# Patient Record
Sex: MALE | Race: BLACK OR AFRICAN AMERICAN | NOT HISPANIC OR LATINO | URBAN - METROPOLITAN AREA
[De-identification: names, ages, dates, MRNs, and addresses within clinical notes are randomized per-mention and may not be internally consistent; named-entity substitution may affect disease eponyms.]

---

## 2018-02-10 ENCOUNTER — EMERGENCY (EMERGENCY)
Facility: HOSPITAL | Age: 28
LOS: 0 days | Discharge: HOME | End: 2018-02-10
Attending: EMERGENCY MEDICINE
Payer: COMMERCIAL

## 2018-02-10 VITALS
TEMPERATURE: 99 F | WEIGHT: 160.06 LBS | HEIGHT: 66 IN | SYSTOLIC BLOOD PRESSURE: 122 MMHG | HEART RATE: 75 BPM | RESPIRATION RATE: 18 BRPM | OXYGEN SATURATION: 99 % | DIASTOLIC BLOOD PRESSURE: 75 MMHG

## 2018-02-10 DIAGNOSIS — J02.9 ACUTE PHARYNGITIS, UNSPECIFIED: ICD-10-CM

## 2018-02-10 DIAGNOSIS — R07.0 PAIN IN THROAT: ICD-10-CM

## 2018-02-10 PROCEDURE — 99283 EMERGENCY DEPT VISIT LOW MDM: CPT

## 2018-02-10 RX ORDER — AMOXICILLIN 250 MG/5ML
1 SUSPENSION, RECONSTITUTED, ORAL (ML) ORAL
Qty: 20 | Refills: 0 | OUTPATIENT
Start: 2018-02-10 | End: 2018-02-19

## 2018-02-10 NOTE — ED PROVIDER NOTE - ATTENDING CONTRIBUTION TO CARE
26 y/o M w no significant hx  w sore throat; no cough or other symptoms; no fever or body aches; father reports he had flu last week    Gen; well appearing, in no distress  HEENT: mild posterior pharyngeal erythema; no exudates; uvula midline;   neck; no stridor; + cervical adenopathy; no nuchal rigidity    A/P: 26 y/o M w pharyngitis; 2/4 centor (no cough, adenopathy); shared decision making regarding abx for possible strep, pt elected for abx now instead of waiting for throat culture  discussed unlikely flu given just pharyngitis, and given patient is young and otherwise healthy, no indication for tamiflu

## 2018-02-10 NOTE — ED PROVIDER NOTE - OBJECTIVE STATEMENT
26 y/o M who denies PMHx is accompanied to ER by his father for c/o sore throat x 2 days. Pt's father reports he was tx for the flu x 1 week, with sx of myalgias,  fever. Pt denies fever, sneezing, rhinorrhea, cough, ear pain, abd pain, N/V. Denies etoh use, smoking.

## 2018-02-10 NOTE — ED PROVIDER NOTE - PHYSICAL EXAMINATION
post pharynx- +mild erythema, no tonsillar edema/exudates. uvula is midline. no drooling. talks in full sentences. +mild cervical LAD.

## 2018-02-10 NOTE — ED PROVIDER NOTE - MEDICAL DECISION MAKING DETAILS
discussed with pt and father DDx, and likely impression of possible strep throat. Pt offered abx right away or can wait for throat cx results in 2-3 days before starting abx. Pt opted to start abx. Salt and warm water gargles, Tylenol or Motrin PRN pain. Follow up with your regular Dr or clinic above in 2 days. Return to ER if you feel worse, or have new symptoms.